# Patient Record
Sex: MALE | Race: WHITE | NOT HISPANIC OR LATINO | Employment: OTHER | URBAN - NONMETROPOLITAN AREA
[De-identification: names, ages, dates, MRNs, and addresses within clinical notes are randomized per-mention and may not be internally consistent; named-entity substitution may affect disease eponyms.]

---

## 2022-12-12 ENCOUNTER — OFFICE VISIT (OUTPATIENT)
Dept: CARDIOLOGY | Facility: CLINIC | Age: 72
End: 2022-12-12

## 2022-12-12 VITALS
OXYGEN SATURATION: 97 % | DIASTOLIC BLOOD PRESSURE: 74 MMHG | SYSTOLIC BLOOD PRESSURE: 133 MMHG | TEMPERATURE: 98.2 F | WEIGHT: 158 LBS | HEART RATE: 97 BPM | BODY MASS INDEX: 23.95 KG/M2 | HEIGHT: 68 IN

## 2022-12-12 DIAGNOSIS — R93.89 ABNORMAL CT OF THE CHEST: ICD-10-CM

## 2022-12-12 DIAGNOSIS — R07.89 OTHER CHEST PAIN: Primary | ICD-10-CM

## 2022-12-12 DIAGNOSIS — R06.02 SHORTNESS OF BREATH: ICD-10-CM

## 2022-12-12 DIAGNOSIS — I48.91 ATRIAL FIBRILLATION, UNSPECIFIED TYPE: ICD-10-CM

## 2022-12-12 DIAGNOSIS — R00.2 PALPITATIONS: ICD-10-CM

## 2022-12-12 DIAGNOSIS — I25.10 CORONARY ARTERY DISEASE INVOLVING NATIVE CORONARY ARTERY OF NATIVE HEART WITHOUT ANGINA PECTORIS: ICD-10-CM

## 2022-12-12 PROBLEM — I63.9 CVA (CEREBRAL VASCULAR ACCIDENT): Status: ACTIVE | Noted: 2022-12-12

## 2022-12-12 PROBLEM — E11.9 TYPE 2 DIABETES MELLITUS WITHOUT COMPLICATION, WITHOUT LONG-TERM CURRENT USE OF INSULIN: Status: ACTIVE | Noted: 2022-12-12

## 2022-12-12 PROBLEM — M19.90 ARTHRITIS: Status: ACTIVE | Noted: 2022-12-12

## 2022-12-12 PROBLEM — K21.9 GERD (GASTROESOPHAGEAL REFLUX DISEASE): Status: ACTIVE | Noted: 2022-12-12

## 2022-12-12 PROBLEM — J44.9 COPD (CHRONIC OBSTRUCTIVE PULMONARY DISEASE): Status: ACTIVE | Noted: 2022-12-12

## 2022-12-12 PROBLEM — I25.810 CORONARY ARTERY DISEASE INVOLVING CORONARY BYPASS GRAFT OF NATIVE HEART WITHOUT ANGINA PECTORIS: Status: ACTIVE | Noted: 2022-12-12

## 2022-12-12 PROBLEM — I61.9 BRAIN BLEED: Status: ACTIVE | Noted: 2022-12-12

## 2022-12-12 PROCEDURE — 99204 OFFICE O/P NEW MOD 45 MIN: CPT | Performed by: NURSE PRACTITIONER

## 2022-12-12 PROCEDURE — 93000 ELECTROCARDIOGRAM COMPLETE: CPT | Performed by: NURSE PRACTITIONER

## 2022-12-12 RX ORDER — GLIPIZIDE 10 MG/1
10 TABLET ORAL DAILY
COMMUNITY
End: 2023-02-23

## 2022-12-12 RX ORDER — INSULIN GLARGINE 100 [IU]/ML
20 INJECTION, SOLUTION SUBCUTANEOUS DAILY
COMMUNITY

## 2022-12-12 RX ORDER — NITROGLYCERIN 0.4 MG/1
TABLET SUBLINGUAL
Qty: 30 TABLET | Refills: 5 | Status: SHIPPED | OUTPATIENT
Start: 2022-12-12 | End: 2022-12-13 | Stop reason: SDUPTHER

## 2022-12-12 RX ORDER — DULOXETIN HYDROCHLORIDE 30 MG/1
30 CAPSULE, DELAYED RELEASE ORAL DAILY
COMMUNITY

## 2022-12-12 RX ORDER — OMEPRAZOLE 20 MG/1
20 CAPSULE, DELAYED RELEASE ORAL AS NEEDED
COMMUNITY

## 2022-12-12 RX ORDER — ALBUTEROL SULFATE 2.5 MG/3ML
2.5 SOLUTION RESPIRATORY (INHALATION) EVERY 4 HOURS PRN
COMMUNITY

## 2022-12-12 RX ORDER — MULTIPLE VITAMINS W/ MINERALS TAB 9MG-400MCG
1 TAB ORAL DAILY
COMMUNITY

## 2022-12-12 RX ORDER — QUETIAPINE 150 MG/1
150 TABLET, FILM COATED, EXTENDED RELEASE ORAL NIGHTLY
COMMUNITY

## 2022-12-12 NOTE — PROGRESS NOTES
Subjective   Deejay Messina is a 72 y.o. male     Chief Complaint   Patient presents with   • Establish Care       HPI    Problem list:    1.  CAD with history of stent x2 per patient report 1982, [facility] in [place], followed Dr. Moeller [phone]  1.1 [facility] with Stent 5/6/2005, patient unsure what artery, Dr. Koch, [facility]  1.2  CTA of the lungs 9/15/2022-severe atherosclerotic plaque formation throughout the coronary arteries, 3.5 similar noncalcified nodule left lower lobe  2.  A. fib diagnosed in 2016, history of loop recorder 3/10/17 - 11/28/17; 2 episodes less than 0.1%, no anticoagulation/antiplatelet due to brain bleed  3.  Type 2 diabetes mellitus diagnosed in 2020  4.  Shortness of breath  4.1 echo 11/20/2018-EF 55 to 60%, diastolic dysfunction 1, trivial AR, moderate MR, TR with RVSP of 26  5.  COPD/asbestos exposure  6.  GERD  7.  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  History of brain bleed 2019 [facility] in [place], [place], craniotomy    Patient is a 72-year-old male who presents today to establish care with his wife at his side.  He said he had right anterior sharp pain that did not radiate.  He says it was active whenever it happened.  He says it is kind of random.  He said he did have to stop and rest for a while.  He did use his inhaler but it did not help.  He said that he did get short of breath whenever it happened.  He says if he has palpitations he really does not notice them.  He was told that they would occur around 3 or 4:00 in the morning.  He did have a loop recorder for 6 months.  He denies any dizziness, presyncope, syncope, orthopnea, PND or edema.  He is short of breath but only with increased activity and this has not changed.  [facility] and [facility] will [facility] those [facility] as well.  CT of the chest indicates that the patient has severe atherosclerotic plaque formation throughout the coronary  arteries.  Patient if not for like wearing a monitor was necessary at this time.    Occupation: Used to work for the electric company, retired he was a substation worker  Patient quit smoking 2016 but smoked 2 packs a day for 40+ years; couple of beers per day; no illicit drugs  Denies soda; 5 cups of coffee per day; no tea    Mom 86 -permanent pacemaker, bradycardia  Dad 62 -COPD, heart issues    We have requested records from his cardiologist, [place], [place] and [facility]. patient's LDL was 99, triglycerides 91, creatinine 1.09 and his potassium was 4.5    Current Outpatient Medications on File Prior to Visit   Medication Sig Dispense Refill   • albuterol (PROVENTIL) (2.5 MG/3ML) 0.083% nebulizer solution Take 2.5 mg by nebulization Every 4 (Four) Hours As Needed for Wheezing.     • dilTIAZem (CARDIZEM) 30 MG tablet Take 15 mg by mouth 3 (Three) Times a Day.     • DULoxetine (CYMBALTA) 30 MG capsule Take 30 mg by mouth Daily.     • glipizide (GLUCOTROL) 10 MG tablet Take 10 mg by mouth Daily.     • insulin glargine (LANTUS, SEMGLEE) 100 UNIT/ML injection Inject 20 Units under the skin into the appropriate area as directed Daily. Sliding Scale - if its over 200 ( Blood Sugar ) pt will take 20 units     • ipratropium (ATROVENT HFA) 17 MCG/ACT inhaler Inhale 2 puffs As Needed for Wheezing.     • multivitamin with minerals tablet tablet Take 1 tablet by mouth Daily.     • omeprazole (priLOSEC) 20 MG capsule Take 20 mg by mouth As Needed.     • QUEtiapine XR (SEROquel XR) 150 MG 24 hr tablet Take 150 mg by mouth Every Night.       No current facility-administered medications on file prior to visit.       ALLERGIES    Codeine, Cotrim [sulfamethoxazole-trimethoprim], Aspirin, and Eliquis [apixaban]    Past Medical History:   Diagnosis Date   • Atrial fibrillation (HCC)    • Clotting disorder (HCC)    • COPD (chronic obstructive pulmonary disease) (HCC)    • Diabetes mellitus  ([facility])    • Stroke ([facility])        Social History     Socioeconomic History   • Marital status:    Tobacco Use   • Smoking status: Former     Packs/day: 1.00     Years: 40.00     Pack years: 40.00     Types: Cigarettes   • Smokeless tobacco: Never   Vaping Use   • Vaping Use: Never used   Substance and Sexual Activity   • Alcohol use: Yes     Alcohol/week: 2.0 standard drinks     Types: 2 Cans of beer per week     Comment: per day   • Drug use: Never   • Sexual activity: Defer       Family History   Problem Relation Age of Onset   • Heart disease Mother    • Hypertension Mother    • Diabetes Mother    • Heart disease Father    • Hypertension Father    • COPD Father        Review of Systems   Constitutional: Positive for appetite change (increased due to medication ). Negative for chills, diaphoresis, fatigue and fever.   HENT: Positive for congestion (chest radha due to COPD ) and hearing loss. Negative for rhinorrhea and sore throat.    Eyes: Positive for visual disturbance (reading glasses ).   Respiratory: Positive for shortness of breath (with CP; with increased activity; no change ). Negative for cough, chest tightness and wheezing.    Cardiovascular: Positive for chest pain (right anterior sharp pain with activity; no rad;    had to stop and rest for awhile, had to use rescue inhaler; did not help; random) and palpitations (a-fib ( silent and hits around 3-4 am in the am when he wore a monitor ); does not feel it ). Negative for leg swelling.   Gastrointestinal: Negative for abdominal pain, blood in stool, constipation, diarrhea, nausea and vomiting.   Endocrine: Negative for cold intolerance and heat intolerance.   Genitourinary: Positive for frequency (worse in the am ). Negative for difficulty urinating, dysuria, hematuria and urgency.   Musculoskeletal: Positive for arthralgias (throughout the body ), back pain (lower back ) and neck pain (back of the neck ). Negative for joint swelling and neck  "stiffness.   Skin: Negative for color change, pallor, rash and wound.   Allergic/Immunologic: Negative for environmental allergies and food allergies.   Neurological: Positive for weakness (left knee ) and numbness (knees due to past SX ). Negative for dizziness, syncope, light-headedness and headaches.   Hematological: Bruises/bleeds easily (Brusies and bleeds easy ).   Psychiatric/Behavioral: Positive for sleep disturbance (hard to go and hard to stay asleep ).       Objective   /74 (BP Location: Right arm, Patient Position: Sitting)   Pulse 97   Temp 98.2 °F (36.8 °C)   Ht 172.7 cm (68\")   Wt 71.7 kg (158 lb)   SpO2 97%   BMI 24.02 kg/m²   Vitals:    12/12/22 1513   BP: 133/74   BP Location: Right arm   Patient Position: Sitting   Pulse: 97   Temp: 98.2 °F (36.8 °C)   SpO2: 97%   Weight: 71.7 kg (158 lb)   Height: 172.7 cm (68\")      Lab Results (most recent)     None        Physical Exam  Vitals reviewed.   Constitutional:       General: He is awake.      Appearance: Normal appearance. He is well-developed, well-groomed and normal weight.   HENT:      Head: Normocephalic.      Right Ear: Decreased hearing noted.      Left Ear: Decreased hearing noted.      Ears:      Comments: Right hearing aid   Eyes:      General: Lids are normal.      Comments: Wears glasses    Neck:      Vascular: No carotid bruit, hepatojugular reflux or JVD.   Cardiovascular:      Rate and Rhythm: Normal rate and regular rhythm.      Pulses:           Radial pulses are 2+ on the right side and 2+ on the left side.        Dorsalis pedis pulses are 2+ on the right side and 2+ on the left side.        Posterior tibial pulses are 2+ on the right side and 2+ on the left side.      Heart sounds: Normal heart sounds.   Pulmonary:      Effort: Pulmonary effort is normal.      Breath sounds: Normal breath sounds and air entry.   Abdominal:      General: Bowel sounds are normal.      Palpations: Abdomen is soft.   Musculoskeletal:      " Right lower leg: No edema.      Left lower leg: No edema.   Skin:     General: Skin is warm and dry.   Neurological:      Mental Status: He is alert and oriented to person, place, and time.   Psychiatric:         Attention and Perception: Attention and perception normal.         Mood and Affect: Mood and affect normal.         Speech: Speech normal.         Behavior: Behavior normal. Behavior is cooperative.         Thought Content: Thought content normal.         Cognition and Memory: Cognition normal. Memory is impaired.         Judgment: Judgment normal.         Procedure     ECG 12 Lead    Date/Time: 12/12/2022 4:24 PM  Performed by: Janice Amezcua APRN  Authorized by: Janice Amezcua APRN   Comparison: not compared with previous ECG   Rhythm: sinus rhythm  Rate: normal  BPM: 84  QRS axis: normal  Other findings: non-specific ST-T wave changes    Clinical impression: abnormal EKG                 Assessment & Plan      Diagnosis Plan   1. Other chest pain  Stress Test With Myocardial Perfusion One Day    Adult Transthoracic Echo Complete W/ Cont if Necessary Per Protocol    nitroglycerin (NITROSTAT) 0.4 MG SL tablet      2. Coronary artery disease involving native coronary artery of native heart without angina pectoris  Stress Test With Myocardial Perfusion One Day    Adult Transthoracic Echo Complete W/ Cont if Necessary Per Protocol    nitroglycerin (NITROSTAT) 0.4 MG SL tablet      3. Atrial fibrillation, unspecified type (HCC)  Stress Test With Myocardial Perfusion One Day    Adult Transthoracic Echo Complete W/ Cont if Necessary Per Protocol      4. Abnormal CT of the chest  Stress Test With Myocardial Perfusion One Day    Adult Transthoracic Echo Complete W/ Cont if Necessary Per Protocol      5. Palpitations  Stress Test With Myocardial Perfusion One Day    Adult Transthoracic Echo Complete W/ Cont if Necessary Per Protocol      6. Shortness of breath  Stress Test With Myocardial Perfusion One Day     Adult Transthoracic Echo Complete W/ Cont if Necessary Per Protocol          Return in about 12 weeks (around 3/6/2023).    Chest pain/CAD/A. fib/abnormal CT of the chest/palpitation/shortness of breath-patient will have an ischemia work-up, stress and echo.  He will use nitro as needed for chest pain no resolution to go to the ER.  He will continue his medication regimen otherwise.  He will follow-up in 12 weeks or sooner if any changes or abnormalities with testing.       Deejay Messina  reports that he has quit smoking. His smoking use included cigarettes. He has a 40.00 pack-year smoking history. He has never used smokeless tobacco.. Advance Care Planning   ACP discussion was declined by the patient. Patient has an advance directive (not in EMR), copy requested.  Patient update medication on My Chart        Electronically signed by:

## 2022-12-13 DIAGNOSIS — R07.89 OTHER CHEST PAIN: ICD-10-CM

## 2022-12-13 DIAGNOSIS — I25.10 CORONARY ARTERY DISEASE INVOLVING NATIVE CORONARY ARTERY OF NATIVE HEART WITHOUT ANGINA PECTORIS: ICD-10-CM

## 2022-12-13 RX ORDER — NITROGLYCERIN 0.4 MG/1
TABLET SUBLINGUAL
Qty: 25 TABLET | Refills: 5 | Status: SHIPPED | OUTPATIENT
Start: 2022-12-13 | End: 2023-01-03 | Stop reason: SDUPTHER

## 2022-12-13 NOTE — TELEPHONE ENCOUNTER
Patient called requesting Nitro sent to [facility]. He is not able to pick it up at [facility] and they can not mail it.

## 2022-12-16 ENCOUNTER — PATIENT ROUNDING (BHMG ONLY) (OUTPATIENT)
Dept: CARDIOLOGY | Facility: CLINIC | Age: 72
End: 2022-12-16

## 2022-12-16 NOTE — PROGRESS NOTES
December 16, 2022    Hello, may I speak with Deejay Messina?    My name is SHERRY MARIE     I am  with [facility]  [address] [place] [id number] [phone].    Before we get started may I verify your date of birth? 1950    I am calling to officially welcome you to our practice and ask about your recent visit. Is this a good time to talk? no    LVM FOR PT TO RETURN NON-URGENT CALL.      Thank you, and have a great day.

## 2022-12-27 ENCOUNTER — HOSPITAL ENCOUNTER (OUTPATIENT)
Dept: CARDIOLOGY | Facility: HOSPITAL | Age: 72
Discharge: HOME | End: 2022-12-27

## 2022-12-27 DIAGNOSIS — I25.10 CORONARY ARTERY DISEASE INVOLVING NATIVE CORONARY ARTERY OF NATIVE HEART WITHOUT ANGINA PECTORIS: ICD-10-CM

## 2022-12-27 DIAGNOSIS — I48.91 ATRIAL FIBRILLATION, UNSPECIFIED TYPE: ICD-10-CM

## 2022-12-27 DIAGNOSIS — R00.2 PALPITATIONS: ICD-10-CM

## 2022-12-27 DIAGNOSIS — R07.89 OTHER CHEST PAIN: ICD-10-CM

## 2022-12-27 DIAGNOSIS — R06.02 SHORTNESS OF BREATH: ICD-10-CM

## 2022-12-27 DIAGNOSIS — R93.89 ABNORMAL CT OF THE CHEST: ICD-10-CM

## 2022-12-27 LAB
BH CV REST NUCLEAR ISOTOPE DOSE: 10 MCI
BH CV STRESS COMMENTS STAGE 1: NORMAL
BH CV STRESS DOSE REGADENOSON STAGE 1: 0.4
BH CV STRESS DURATION MIN STAGE 1: 0
BH CV STRESS DURATION SEC STAGE 1: 10
BH CV STRESS NUCLEAR ISOTOPE DOSE: 30 MCI
BH CV STRESS PROTOCOL 1: NORMAL
BH CV STRESS RECOVERY BP: NORMAL MMHG
BH CV STRESS RECOVERY HR: 80 BPM
BH CV STRESS STAGE 1: 1
MAXIMAL PREDICTED HEART RATE: 146 BPM
PERCENT MAX PREDICTED HR: 54.79 %
STRESS BASELINE BP: NORMAL MMHG
STRESS BASELINE HR: 57 BPM
STRESS PERCENT HR: 64 %
STRESS POST PEAK BP: NORMAL MMHG
STRESS POST PEAK HR: 80 BPM
STRESS TARGET HR: 124 BPM

## 2022-12-27 PROCEDURE — 0 TECHNETIUM SESTAMIBI: Performed by: INTERNAL MEDICINE

## 2022-12-27 PROCEDURE — 78452 HT MUSCLE IMAGE SPECT MULT: CPT

## 2022-12-27 PROCEDURE — 93306 TTE W/DOPPLER COMPLETE: CPT

## 2022-12-27 PROCEDURE — 93017 CV STRESS TEST TRACING ONLY: CPT

## 2022-12-27 PROCEDURE — 93306 TTE W/DOPPLER COMPLETE: CPT | Performed by: INTERNAL MEDICINE

## 2022-12-27 PROCEDURE — A9500 TC99M SESTAMIBI: HCPCS | Performed by: INTERNAL MEDICINE

## 2022-12-27 PROCEDURE — 78452 HT MUSCLE IMAGE SPECT MULT: CPT | Performed by: INTERNAL MEDICINE

## 2022-12-27 PROCEDURE — 25010000002 REGADENOSON 0.4 MG/5ML SOLUTION: Performed by: INTERNAL MEDICINE

## 2022-12-27 PROCEDURE — 93018 CV STRESS TEST I&R ONLY: CPT | Performed by: INTERNAL MEDICINE

## 2022-12-27 RX ADMIN — TECHNETIUM TC 99M SESTAMIBI 1 DOSE: 1 INJECTION INTRAVENOUS at 09:24

## 2022-12-27 RX ADMIN — REGADENOSON 0.4 MG: 0.08 INJECTION, SOLUTION INTRAVENOUS at 11:29

## 2022-12-27 RX ADMIN — TECHNETIUM TC 99M SESTAMIBI 1 DOSE: 1 INJECTION INTRAVENOUS at 11:29

## 2023-01-03 ENCOUNTER — OFFICE VISIT (OUTPATIENT)
Dept: CARDIOLOGY | Facility: CLINIC | Age: 73
End: 2023-01-03
Payer: MEDICARE

## 2023-01-03 ENCOUNTER — LAB (OUTPATIENT)
Dept: CARDIOLOGY | Facility: CLINIC | Age: 73
End: 2023-01-03
Payer: MEDICARE

## 2023-01-03 ENCOUNTER — TELEPHONE (OUTPATIENT)
Dept: CARDIOLOGY | Facility: CLINIC | Age: 73
End: 2023-01-03
Payer: MEDICARE

## 2023-01-03 VITALS
HEART RATE: 102 BPM | OXYGEN SATURATION: 97 % | TEMPERATURE: 97.1 F | SYSTOLIC BLOOD PRESSURE: 120 MMHG | WEIGHT: 156 LBS | DIASTOLIC BLOOD PRESSURE: 70 MMHG | HEIGHT: 68 IN | BODY MASS INDEX: 23.64 KG/M2

## 2023-01-03 DIAGNOSIS — R93.89 ABNORMAL CT OF THE CHEST: ICD-10-CM

## 2023-01-03 DIAGNOSIS — R94.39 ABNORMAL STRESS TEST: Primary | ICD-10-CM

## 2023-01-03 DIAGNOSIS — I25.10 CORONARY ARTERY DISEASE INVOLVING NATIVE CORONARY ARTERY OF NATIVE HEART WITHOUT ANGINA PECTORIS: ICD-10-CM

## 2023-01-03 DIAGNOSIS — R06.02 SHORTNESS OF BREATH: ICD-10-CM

## 2023-01-03 DIAGNOSIS — R07.89 OTHER CHEST PAIN: ICD-10-CM

## 2023-01-03 DIAGNOSIS — I48.91 ATRIAL FIBRILLATION, UNSPECIFIED TYPE: ICD-10-CM

## 2023-01-03 DIAGNOSIS — Z98.890 H/O CRANIOTOMY: ICD-10-CM

## 2023-01-03 LAB
ANION GAP SERPL CALCULATED.3IONS-SCNC: 12.6 MMOL/L (ref 5–15)
BUN SERPL-MCNC: 13 MG/DL (ref 8–23)
BUN/CREAT SERPL: 14.8 (ref 7–25)
CALCIUM SPEC-SCNC: 8.8 MG/DL (ref 8.6–10.5)
CHLORIDE SERPL-SCNC: 102 MMOL/L (ref 98–107)
CO2 SERPL-SCNC: 24.4 MMOL/L (ref 22–29)
CREAT SERPL-MCNC: 0.88 MG/DL (ref 0.76–1.27)
DEPRECATED RDW RBC AUTO: 45.3 FL (ref 37–54)
EGFRCR SERPLBLD CKD-EPI 2021: 90.2 ML/MIN/1.73
ERYTHROCYTE [DISTWIDTH] IN BLOOD BY AUTOMATED COUNT: 13 % (ref 12.3–15.4)
GLUCOSE SERPL-MCNC: 120 MG/DL (ref 65–99)
HCT VFR BLD AUTO: 44.9 % (ref 37.5–51)
HGB BLD-MCNC: 15.3 G/DL (ref 13–17.7)
MCH RBC QN AUTO: 32.3 PG (ref 26.6–33)
MCHC RBC AUTO-ENTMCNC: 34.1 G/DL (ref 31.5–35.7)
MCV RBC AUTO: 94.7 FL (ref 79–97)
PLATELET # BLD AUTO: 259 10*3/MM3 (ref 140–450)
PMV BLD AUTO: 10.2 FL (ref 6–12)
POTASSIUM SERPL-SCNC: 4.2 MMOL/L (ref 3.5–5.2)
RBC # BLD AUTO: 4.74 10*6/MM3 (ref 4.14–5.8)
SODIUM SERPL-SCNC: 139 MMOL/L (ref 136–145)
WBC NRBC COR # BLD: 6.54 10*3/MM3 (ref 3.4–10.8)

## 2023-01-03 PROCEDURE — 99214 OFFICE O/P EST MOD 30 MIN: CPT | Performed by: NURSE PRACTITIONER

## 2023-01-03 PROCEDURE — 80048 BASIC METABOLIC PNL TOTAL CA: CPT | Performed by: NURSE PRACTITIONER

## 2023-01-03 PROCEDURE — 1159F MED LIST DOCD IN RCRD: CPT | Performed by: NURSE PRACTITIONER

## 2023-01-03 PROCEDURE — 85027 COMPLETE CBC AUTOMATED: CPT | Performed by: NURSE PRACTITIONER

## 2023-01-03 PROCEDURE — 1160F RVW MEDS BY RX/DR IN RCRD: CPT | Performed by: NURSE PRACTITIONER

## 2023-01-03 RX ORDER — NITROGLYCERIN 0.4 MG/1
TABLET SUBLINGUAL
Qty: 50 TABLET | Refills: 5 | Status: SHIPPED | OUTPATIENT
Start: 2023-01-03

## 2023-01-03 RX ORDER — ISOSORBIDE MONONITRATE 30 MG/1
15 TABLET, EXTENDED RELEASE ORAL DAILY
Qty: 30 TABLET | Refills: 11 | Status: SHIPPED | OUTPATIENT
Start: 2023-01-03 | End: 2023-02-23 | Stop reason: SDUPTHER

## 2023-01-03 NOTE — PROGRESS NOTES
Subjective   Deejay Messina is a 72 y.o. male     Chief Complaint   Patient presents with   • Follow-up     Chest pain          HPI    Problem list:    1.  CAD with history of stent x2 per patient report 1982, [facility] in [place], followed Dr. Moeller [phone]  1.1 [facility] with Stent 5/6/2005, patient unsure what artery, Dr. Koch, [facility]  1.2  CTA of the lungs 9/15/2022-severe atherosclerotic plaque formation throughout the coronary arteries, 3.5 similar noncalcified nodule left lower lobe  1.3 stress test 12/27/2022-large, dense but incompletely reversible defect involving the entirety of the inferior wall as well as the more basal portions of the inferolateral wall compatible with ischemia, post-rest EF 48%, no wall motion abnormalities.  2.  A. fib diagnosed in 2016, history of loop recorder 3/10/17 - 11/28/17; 2 episodes less than 0.1%, no anticoagulation/antiplatelet due to brain bleed  3.  Type 2 diabetes mellitus diagnosed in 2020  4.  Shortness of breath  4.1 echo 11/20/2018-EF 55 to 60%, diastolic dysfunction 1, trivial AR, moderate MR, TR with RVSP of 26  5.  COPD/asbestos exposure  6.  GERD  7.  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  History of brain bleed 2019 [facility] in [place], [place], craniotomy    Patient is a 72-year-old male who presents today for follow-up on stress test with his wife at his side.  He continues to have what he describes as a right anterior sharp pain that occasionally goes into his back.  He says that he also has on the left on occasion.  He says he usually notices it with rest.  He does get short of breath whenever it happens.  He does use nitroglycerin up to 1 tab and it does go away.  He says he was having it daily and then he had a little reprieve for a few days and then it came back.  He says it typically lasts about 3 minutes.  He denies any palpitations, fluttering, dizziness, presyncope, syncope, orthopnea, PND or edema.   Patient says that he does have shortness of breath whenever he exerts himself but this is new for him also.  Patient says he also gets a burning in his chest and he thinks this is heartburn.  I advised her bradycardia could also be cardiac in nature.  Patient has not followed up with neurosurgery since having his craniotomy back in 2019.  I advised due to the fact he will need antiplatelet should he get a stent that we need to have clearance from neurosurgery.  They are willing to go to the local neurosurgeons here for clearance.    We went over stress test.    Current Outpatient Medications on File Prior to Visit   Medication Sig Dispense Refill   • albuterol (PROVENTIL) (2.5 MG/3ML) 0.083% nebulizer solution Take 2.5 mg by nebulization Every 4 (Four) Hours As Needed for Wheezing.     • dilTIAZem (CARDIZEM) 30 MG tablet Take 15 mg by mouth 3 (Three) Times a Day.     • DULoxetine (CYMBALTA) 30 MG capsule Take 30 mg by mouth Daily.     • glipizide (GLUCOTROL) 10 MG tablet Take 10 mg by mouth Daily.     • insulin glargine (LANTUS, SEMGLEE) 100 UNIT/ML injection Inject 20 Units under the skin into the appropriate area as directed Daily. Sliding Scale - if its over 200 ( Blood Sugar ) pt will take 20 units     • ipratropium (ATROVENT HFA) 17 MCG/ACT inhaler Inhale 2 puffs As Needed for Wheezing.     • multivitamin with minerals tablet tablet Take 1 tablet by mouth Daily.     • nitroglycerin (NITROSTAT) 0.4 MG SL tablet 1 under the tongue as needed for angina, may repeat q5mins for up three doses 25 tablet 5   • omeprazole (priLOSEC) 20 MG capsule Take 20 mg by mouth As Needed.     • QUEtiapine XR (SEROquel XR) 150 MG 24 hr tablet Take 150 mg by mouth Every Night.       No current facility-administered medications on file prior to visit.       ALLERGIES    Codeine, Cotrim [sulfamethoxazole-trimethoprim], Aspirin, and Eliquis [apixaban]    Past Medical History:   Diagnosis Date   • Atrial fibrillation (HCC)    • Clotting  disorder (HCC)    • COPD (chronic obstructive pulmonary disease) ([facility])    • Diabetes mellitus (HCC)    • Stroke ([facility])        Social History     Socioeconomic History   • Marital status:    Tobacco Use   • Smoking status: Former     Packs/day: 1.00     Years: 40.00     Pack years: 40.00     Types: Cigarettes   • Smokeless tobacco: Never   Vaping Use   • Vaping Use: Never used   Substance and Sexual Activity   • Alcohol use: Yes     Alcohol/week: 2.0 standard drinks     Types: 2 Cans of beer per week     Comment: per day   • Drug use: Never   • Sexual activity: Defer       Family History   Problem Relation Age of Onset   • Heart disease Mother    • Hypertension Mother    • Diabetes Mother    • Heart disease Father    • Hypertension Father    • COPD Father        Review of Systems   Constitutional: Negative for appetite change, chills, diaphoresis, fatigue and fever.   HENT: Negative for congestion, rhinorrhea and sore throat.    Eyes: Positive for visual disturbance (glasses ( reading ) ).   Respiratory: Positive for shortness of breath (with any type of activity or walking at any distance he states that this is new and he will have to stop and rest and wait for a few mins; with CP ). Negative for cough, chest tightness and wheezing.    Cardiovascular: Positive for chest pain (right anterior goes into back on occasion, sharp in nature, goes away with 1 nitro sometimes gets left anterior; usually at rest; initially daily then had a 3 day repreive and now back; most recent did not have to take nitro; lasts maybe 3 min). Negative for palpitations and leg swelling.   Gastrointestinal: Negative for abdominal pain, blood in stool, constipation, diarrhea, nausea and vomiting.        Burning sensation as well feels like it is burning    Endocrine: Negative for cold intolerance and heat intolerance.   Genitourinary: Negative for difficulty urinating, dysuria, frequency, hematuria and urgency.   Musculoskeletal: Positive  for arthralgias (neck ,back and knees ), back pain (lower back ) and neck pain (back of the neck due to arthritis ). Negative for joint swelling and neck stiffness.   Skin: Positive for rash (left thumb ). Negative for color change, pallor and wound.   Allergic/Immunologic: Negative for environmental allergies and food allergies.   Neurological: Positive for numbness (feet ( numbness ) from time to time ) and headaches (everyday ( front of the head ) he states that this is new ). Negative for dizziness, syncope, weakness and light-headedness.   Hematological: Bruises/bleeds easily (Bruises easy ).   Psychiatric/Behavioral: Negative for sleep disturbance.       Objective   /70 (BP Location: Right arm, Patient Position: Sitting)   Pulse 102   Temp 97.1 °F (36.2 °C)   Ht 172.7 cm (68\")   Wt 70.8 kg (156 lb)   SpO2 97%   BMI 23.72 kg/m²   Vitals:    01/03/23 0918   BP: 120/70   BP Location: Right arm   Patient Position: Sitting   Pulse: 102   Temp: 97.1 °F (36.2 °C)   SpO2: 97%   Weight: 70.8 kg (156 lb)   Height: 172.7 cm (68\")      Lab Results (most recent)     None        Physical Exam  Vitals reviewed.   Constitutional:       General: He is awake.      Appearance: Normal appearance. He is well-developed, well-groomed and normal weight.   HENT:      Head: Normocephalic.      Right Ear: Decreased hearing noted.      Left Ear: Decreased hearing noted.      Ears:      Comments: [place] hearing aids.  Eyes:      General: Lids are normal.      Comments: Wears glasses    Neck:      Vascular: No carotid bruit, hepatojugular reflux or JVD.   Cardiovascular:      Rate and Rhythm: Regular rhythm. Tachycardia present.      Pulses:           Radial pulses are 2+ on the right side and 2+ on the left side.        Dorsalis pedis pulses are 2+ on the right side and 2+ on the left side.        Posterior tibial pulses are 2+ on the right side and 2+ on the left side.      Heart sounds: Normal heart sounds.   Pulmonary:       Effort: Pulmonary effort is normal.      Breath sounds: Normal breath sounds and air entry.   Abdominal:      General: Bowel sounds are normal.      Palpations: Abdomen is soft.   Musculoskeletal:      Right lower leg: No edema.      Left lower leg: No edema.   Skin:     General: Skin is warm and dry.   Neurological:      Mental Status: He is alert and oriented to person, place, and time.   Psychiatric:         Attention and Perception: Attention and perception normal.         Mood and Affect: Mood and affect normal.         Speech: Speech normal.         Behavior: Behavior normal. Behavior is cooperative.         Thought Content: Thought content normal.         Cognition and Memory: Cognition and memory normal.         Judgment: Judgment normal.         Procedure   Procedures         Assessment & Plan      Diagnosis Plan   1. Abnormal stress test  [facility]      2. Coronary artery disease involving native coronary artery of native heart without angina pectoris  [facility]    CBC (No Diff)    Basic Metabolic Panel      3. Atrial fibrillation, unspecified type (HCC)  [facility]      4. Shortness of breath  [facility]      5. Abnormal CT of the chest  [facility]      6. Other chest pain  [facility]      7. H/O craniotomy  Ambulatory Referral to Neurosurgery          Return 2-4 WEEKS AFTER [facility].    Abnormal stress test/CAD/A. fib/shortness of breath/abnormal CT of the chest/chest pain-patient will have left heart cath in February.  We are doing this because patient has a history of craniotomy and we need neurosurgery cleared for dual antiplatelet should he receive stents.  We will refer him to Dr. Chaidez/Octavio for clearance as he has not followed up with anybody since having his craniotomy back in 2019.  We are in the process of still trying to receive his records that way we will be able for them as well.  He is going start isosorbide half a tab a  day.  He will use nitro as needed for chest pain no resolution he will go to the ER.  He will get a BMP and CBC today.  He will follow-up 2 to 4 weeks after heart catheter or sooner if any changes.    Again the referral for neurosurgery is to see if patient will be able to have dual antiplatelet should he receive stents and if so for how long.  Typically he needs to be on dual antiplatelet for 6 months and then we can go down to single antiplatelet after that.       Deejay Messina  reports that he has quit smoking. His smoking use included cigarettes. He has a 40.00 pack-year smoking history. He has never used smokeless tobacco..     Patient brought in medicine list to appointment, it's been reviewed with patient and med list was updated in the chart.     Advance Care Planning   ACP discussion was declined by the patient. Patient has an advance directive (not in EMR), copy requested.         Electronically signed by:

## 2023-01-03 NOTE — TELEPHONE ENCOUNTER
----- Message from MARTHA Cisneros sent at 1/3/2023  3:01 PM EST -----  CR AND PLATELETS WITHIN NORMAL LIMITS FOR LHC            Sent pt a my chart mess regarding labs for LHC ( WNL)    Parris Martinez , RMAURELIA    Creatinine  0.7  Platelets  140 - 450 10*3/mm3 259        Platelets  140 - 450 10*3/mm3 259      6 - 1.27 mg/dL 0.88      Platelets  140 - 450 10*3/mm3 259

## 2023-01-04 LAB
BH CV ECHO MEAS - ACS: 2.24 CM
BH CV ECHO MEAS - AO MAX PG: 7.8 MMHG
BH CV ECHO MEAS - AO MEAN PG: 4.5 MMHG
BH CV ECHO MEAS - AO ROOT DIAM: 3.5 CM
BH CV ECHO MEAS - AO V2 MAX: 139.8 CM/SEC
BH CV ECHO MEAS - AO V2 VTI: 34.5 CM
BH CV ECHO MEAS - EDV(CUBED): 79 ML
BH CV ECHO MEAS - EDV(MOD-SP4): 74.7 ML
BH CV ECHO MEAS - EF(MOD-SP4): 61.8 %
BH CV ECHO MEAS - EF_3D-VOL: 63 %
BH CV ECHO MEAS - ESV(CUBED): 16.6 ML
BH CV ECHO MEAS - ESV(MOD-SP4): 28.5 ML
BH CV ECHO MEAS - FS: 40.6 %
BH CV ECHO MEAS - IVS/LVPW: 0.91 CM
BH CV ECHO MEAS - IVSD: 1.2 CM
BH CV ECHO MEAS - LA DIMENSION: 3.4 CM
BH CV ECHO MEAS - LAT PEAK E' VEL: 6.5 CM/SEC
BH CV ECHO MEAS - LV DIASTOLIC VOL/BSA (35-75): 40.4 CM2
BH CV ECHO MEAS - LV MASS(C)D: 197.7 GRAMS
BH CV ECHO MEAS - LV SYSTOLIC VOL/BSA (12-30): 15.4 CM2
BH CV ECHO MEAS - LVIDD: 4.3 CM
BH CV ECHO MEAS - LVIDS: 2.6 CM
BH CV ECHO MEAS - LVOT AREA: 3.1 CM2
BH CV ECHO MEAS - LVOT DIAM: 1.97 CM
BH CV ECHO MEAS - LVPWD: 1.32 CM
BH CV ECHO MEAS - MED PEAK E' VEL: 7.1 CM/SEC
BH CV ECHO MEAS - MV A MAX VEL: 110.5 CM/SEC
BH CV ECHO MEAS - MV DEC SLOPE: 368.9 CM/SEC2
BH CV ECHO MEAS - MV E MAX VEL: 92.1 CM/SEC
BH CV ECHO MEAS - MV E/A: 0.83
BH CV ECHO MEAS - RAP SYSTOLE: 10 MMHG
BH CV ECHO MEAS - RVDD: 3.3 CM
BH CV ECHO MEAS - RVSP: 35.5 MMHG
BH CV ECHO MEAS - SI(MOD-SP4): 25 ML/M2
BH CV ECHO MEAS - SV(MOD-SP4): 46.2 ML
BH CV ECHO MEAS - TR MAX PG: 25.5 MMHG
BH CV ECHO MEAS - TR MAX VEL: 252.5 CM/SEC
BH CV ECHO MEASUREMENTS AVERAGE E/E' RATIO: 13.54
LEFT ATRIUM VOLUME INDEX: 29.5 ML/M2
MAXIMAL PREDICTED HEART RATE: 146 BPM
STRESS TARGET HR: 124 BPM

## 2023-01-05 ENCOUNTER — TELEPHONE (OUTPATIENT)
Dept: CARDIOLOGY | Facility: CLINIC | Age: 73
End: 2023-01-05
Payer: MEDICARE

## 2023-01-05 NOTE — TELEPHONE ENCOUNTER
----- Message from MARTHA Cisneros sent at 1/5/2023  6:27 AM EST -----  Please advise patient.       Pt was sent a My Chart mess regarding his Echo results     Normal     Echo - ejection fraction is 55 to 60%.

## 2023-01-09 ENCOUNTER — TELEPHONE (OUTPATIENT)
Dept: CARDIOLOGY | Facility: CLINIC | Age: 73
End: 2023-01-09
Payer: MEDICARE

## 2023-01-09 RX ORDER — RANOLAZINE 500 MG/1
500 TABLET, EXTENDED RELEASE ORAL 2 TIMES DAILY
Qty: 60 TABLET | Refills: 11 | Status: SHIPPED | OUTPATIENT
Start: 2023-01-09 | End: 2023-02-06 | Stop reason: SDUPTHER

## 2023-01-12 ENCOUNTER — TELEPHONE (OUTPATIENT)
Dept: CARDIOLOGY | Facility: CLINIC | Age: 73
End: 2023-01-12
Payer: MEDICARE

## 2023-01-12 NOTE — TELEPHONE ENCOUNTER
Pt was sent a My Chart mess stating that Dr Ceballos would proceed with [facility] and if pt would like a sooner apt 01/31/2023 is the earliest which will be after lunch or if someone cancels we can get him in then . Or pt can keep the 02/03/2023 [facility] apt     HANANE Oh

## 2023-01-30 ENCOUNTER — TELEPHONE (OUTPATIENT)
Dept: CARDIOLOGY | Facility: CLINIC | Age: 73
End: 2023-01-30
Payer: MEDICARE

## 2023-01-30 NOTE — TELEPHONE ENCOUNTER
Pt LVM stating he has a cath tomorrow at 1400 and has several questions.   #[phone]      Per chart review, cath is scheduled for 1/31/23 at 2pm, needs to arrive by noon. No med holds.   Encounter from 1/12/23 states they will proceed w/ C w/o clearance from neuro.          Called pt, his wife answered:  · they asked about eating, I explained that he can have a light breakfast   · Asked about stenting, I explained if one if needed, he will be kept overnight    They denied having any additional questions.

## 2023-01-31 ENCOUNTER — OUTSIDE FACILITY SERVICE (OUTPATIENT)
Dept: CARDIOLOGY | Facility: CLINIC | Age: 73
End: 2023-01-31
Payer: MEDICARE

## 2023-01-31 PROCEDURE — 93458 L HRT ARTERY/VENTRICLE ANGIO: CPT | Performed by: INTERNAL MEDICINE

## 2023-02-01 ENCOUNTER — OUTSIDE FACILITY SERVICE (OUTPATIENT)
Dept: CARDIOLOGY | Facility: CLINIC | Age: 73
End: 2023-02-01
Payer: MEDICARE

## 2023-02-01 DIAGNOSIS — R93.89 ABNORMAL CT OF THE CHEST: ICD-10-CM

## 2023-02-01 DIAGNOSIS — I48.91 ATRIAL FIBRILLATION, UNSPECIFIED TYPE: ICD-10-CM

## 2023-02-01 DIAGNOSIS — R06.02 SHORTNESS OF BREATH: ICD-10-CM

## 2023-02-01 DIAGNOSIS — R07.89 OTHER CHEST PAIN: ICD-10-CM

## 2023-02-01 DIAGNOSIS — R94.39 ABNORMAL STRESS TEST: ICD-10-CM

## 2023-02-01 DIAGNOSIS — I25.10 CORONARY ARTERY DISEASE INVOLVING NATIVE CORONARY ARTERY OF NATIVE HEART WITHOUT ANGINA PECTORIS: ICD-10-CM

## 2023-02-06 RX ORDER — RANOLAZINE 500 MG/1
500 TABLET, EXTENDED RELEASE ORAL 2 TIMES DAILY
Qty: 60 TABLET | Refills: 11 | Status: SHIPPED | OUTPATIENT
Start: 2023-02-06 | End: 2023-02-23

## 2023-02-23 ENCOUNTER — OFFICE VISIT (OUTPATIENT)
Dept: CARDIOLOGY | Facility: CLINIC | Age: 73
End: 2023-02-23
Payer: MEDICARE

## 2023-02-23 VITALS
SYSTOLIC BLOOD PRESSURE: 109 MMHG | HEART RATE: 78 BPM | DIASTOLIC BLOOD PRESSURE: 62 MMHG | OXYGEN SATURATION: 96 % | WEIGHT: 163 LBS | BODY MASS INDEX: 24.71 KG/M2 | HEIGHT: 68 IN

## 2023-02-23 DIAGNOSIS — R06.02 SHORTNESS OF BREATH: ICD-10-CM

## 2023-02-23 DIAGNOSIS — R07.89 OTHER CHEST PAIN: ICD-10-CM

## 2023-02-23 DIAGNOSIS — I48.91 ATRIAL FIBRILLATION, UNSPECIFIED TYPE: ICD-10-CM

## 2023-02-23 DIAGNOSIS — I25.10 CORONARY ARTERY DISEASE INVOLVING NATIVE CORONARY ARTERY OF NATIVE HEART WITHOUT ANGINA PECTORIS: Primary | ICD-10-CM

## 2023-02-23 PROCEDURE — 99214 OFFICE O/P EST MOD 30 MIN: CPT | Performed by: NURSE PRACTITIONER

## 2023-02-23 RX ORDER — ISOSORBIDE MONONITRATE 30 MG/1
15 TABLET, EXTENDED RELEASE ORAL DAILY
Qty: 90 TABLET | Refills: 3 | Status: SHIPPED | OUTPATIENT
Start: 2023-02-23

## 2023-02-23 RX ORDER — EZETIMIBE 10 MG/1
10 TABLET ORAL DAILY
Qty: 90 TABLET | Refills: 3 | Status: SHIPPED | OUTPATIENT
Start: 2023-02-23

## 2023-02-23 RX ORDER — EMPAGLIFLOZIN 25 MG/1
TABLET, FILM COATED ORAL
COMMUNITY
Start: 2023-02-20

## 2023-02-23 NOTE — PROGRESS NOTES
Subjective   Deejay Messina is a 72 y.o. male     Chief Complaint   Patient presents with   • Follow-up     CATH F/U        HPI    Problem list:    1.  CAD with history of stent x2 per patient report 1982, [facility] in [place], followed Dr. Moeller [phone]  1.1 [facility] with Stent 5/6/2005, patient unsure what artery, Dr. Koch, [facility]  1.2  CTA of the lungs 9/15/2022-severe atherosclerotic plaque formation throughout the coronary arteries, 3.5 similar noncalcified nodule left lower lobe  1.3 stress test 12/27/2022-large, dense but incompletely reversible defect involving the entirety of the inferior wall as well as the more basal portions of the inferolateral wall compatible with ischemia, post-rest EF 48%, no wall motion abnormalities.  1.4 left heart cath 1/31/2020 23-50% stenosis in the circumflex, another 50% and a stent that is in the third obtuse, LAD had diffuse irregularities and distal to the stent there is no discrete high-grade stenosis, right coronary artery had a proximal 50% stenosis in the proximal vessel, EF 50 to 55%, LVEDP 16  2.  A. fib diagnosed in 2016, history of loop recorder 3/10/17 - 11/28/17; 2 episodes less than 0.1%, no anticoagulation/antiplatelet due to brain bleed  3.  Type 2 diabetes mellitus diagnosed in 2020  4.  Shortness of breath  4.1 echo 11/20/2018-EF 55 to 60%, diastolic dysfunction 1, trivial AR, moderate MR, TR with RVSP of 26  4.2 Echo 12/27/2022-EF 61 to 65%, mild LVH, diastolic dysfunction 1, PA 35-45  5.  COPD/asbestos exposure  6.  GERD  7.  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  History of brain bleed 2019 [facility] in [place], [place], craniotomy    Patient is a 72-year-old male who presents today for follow-up status post left heart cath with his wife at his side.  He denies any chest pain, pressure, palpitations, fluttering, dizziness, presyncope, syncope, orthopnea, PND or edema.  Patient says he does have some shortness of  breath if he overdoes it.  Overall he feels like he is doing well.  He was very impressed with the whole cath team and Dr. Ceballos at the hospital.  Right radial wrist site unremarkable.    We went over his left heart cath and echo.  His last LDL was 99.  I did advise patient on need to be on medication for cholesterol.  He does not want to take any statins because it caused him muscle aches in the past.  He is willing to try Zetia.    Current Outpatient Medications on File Prior to Visit   Medication Sig Dispense Refill   • albuterol (PROVENTIL) (2.5 MG/3ML) 0.083% nebulizer solution Take 2.5 mg by nebulization Every 4 (Four) Hours As Needed for Wheezing.     • dilTIAZem (CARDIZEM) 30 MG tablet Take 15 mg by mouth 3 (Three) Times a Day.     • DULoxetine (CYMBALTA) 30 MG capsule Take 30 mg by mouth Daily.     • insulin glargine (LANTUS, SEMGLEE) 100 UNIT/ML injection Inject 20 Units under the skin into the appropriate area as directed Daily. Sliding Scale - if its over 200 ( Blood Sugar ) pt will take 20 units     • ipratropium (ATROVENT HFA) 17 MCG/ACT inhaler Inhale 2 puffs As Needed for Wheezing.     • Jardiance 25 MG tablet tablet TAKE 1 TABLET EVERY DAY BY ORAL ROUTE FOR 30 DAYS.     • multivitamin with minerals tablet tablet Take 1 tablet by mouth Daily.     • nitroglycerin (NITROSTAT) 0.4 MG SL tablet 1 under the tongue as needed for angina, may repeat q5mins for up three doses 50 tablet 5   • omeprazole (priLOSEC) 20 MG capsule Take 20 mg by mouth As Needed.     • QUEtiapine XR (SEROquel XR) 150 MG 24 hr tablet Take 150 mg by mouth Every Night.     • [DISCONTINUED] isosorbide mononitrate (IMDUR) 30 MG 24 hr tablet Take 0.5 tablets by mouth Daily. 30 tablet 11   • [DISCONTINUED] glipizide (GLUCOTROL) 10 MG tablet Take 10 mg by mouth Daily.     • [DISCONTINUED] ranolazine (Ranexa) 500 MG 12 hr tablet Take 1 tablet by mouth 2 (Two) Times a Day. 60 tablet 11     No current facility-administered medications on  file prior to visit.       ALLERGIES    Codeine, Cotrim [sulfamethoxazole-trimethoprim], Statins, Aspirin, and Eliquis [apixaban]    Past Medical History:   Diagnosis Date   • Atrial fibrillation (HCC)    • Clotting disorder (HCC)    • COPD (chronic obstructive pulmonary disease) (HCC)    • Diabetes mellitus (HCC)    • Stroke (HCC)        Social History     Socioeconomic History   • Marital status:    Tobacco Use   • Smoking status: Former     Packs/day: 1.00     Years: 40.00     Pack years: 40.00     Types: Cigarettes   • Smokeless tobacco: Never   Vaping Use   • Vaping Use: Never used   Substance and Sexual Activity   • Alcohol use: Yes     Alcohol/week: 2.0 standard drinks     Types: 2 Cans of beer per week     Comment: per day   • Drug use: Never   • Sexual activity: Defer       Family History   Problem Relation Age of Onset   • Heart disease Mother    • Hypertension Mother    • Diabetes Mother    • Heart disease Father    • Hypertension Father    • COPD Father        Review of Systems   Constitutional: Negative for appetite change, chills, fatigue and fever.   HENT: Positive for hearing loss. Negative for dental problem, drooling, ear discharge, ear pain, sinus pressure, sinus pain, sneezing, sore throat and tinnitus.    Eyes: Positive for visual disturbance (wears glasses ). Negative for pain, redness and itching.   Respiratory: Positive for shortness of breath (does with activity; if over does it). Negative for cough, choking and wheezing.    Cardiovascular: Negative for chest pain, palpitations and leg swelling.   Gastrointestinal: Negative for blood in stool, constipation, diarrhea, nausea and rectal pain.   Endocrine: Negative.    Genitourinary: Negative for difficulty urinating.   Musculoskeletal: Positive for arthralgias, back pain and neck pain.   Skin: Negative for rash and wound.   Allergic/Immunologic: Negative for environmental allergies.   Neurological: Negative for dizziness, weakness and  "numbness.   Psychiatric/Behavioral: Positive for sleep disturbance.       Objective   /62   Pulse 78   Ht 172.7 cm (67.99\")   Wt 73.9 kg (163 lb)   SpO2 96%   BMI 24.79 kg/m²   Vitals:    02/23/23 0912   BP: 109/62   Pulse: 78   SpO2: 96%   Weight: 73.9 kg (163 lb)   Height: 172.7 cm (67.99\")      Lab Results (most recent)     None        Physical Exam  Vitals reviewed.   Constitutional:       General: He is awake.      Appearance: Normal appearance. He is well-developed, well-groomed and normal weight.   HENT:      Head: Normocephalic.      Right Ear: Decreased hearing noted.      Left Ear: Decreased hearing noted.      Ears:      Comments: Bilateral hearing aids  Eyes:      General: Lids are normal.      Comments: Wears glasses    Neck:      Vascular: No carotid bruit, hepatojugular reflux or JVD.   Cardiovascular:      Rate and Rhythm: Normal rate and regular rhythm.      Pulses:           Radial pulses are 2+ on the right side and 2+ on the left side.        Dorsalis pedis pulses are 2+ on the right side and 2+ on the left side.        Posterior tibial pulses are 2+ on the right side and 2+ on the left side.      Heart sounds: Normal heart sounds.   Pulmonary:      Effort: Pulmonary effort is normal.      Breath sounds: Normal breath sounds and air entry.   Abdominal:      General: Bowel sounds are normal.      Palpations: Abdomen is soft.   Musculoskeletal:      Right lower leg: No edema.      Left lower leg: No edema.   Skin:     General: Skin is warm and dry.   Neurological:      Mental Status: He is alert and oriented to person, place, and time.   Psychiatric:         Attention and Perception: Attention and perception normal.         Mood and Affect: Mood and affect normal.         Speech: Speech normal.         Behavior: Behavior normal. Behavior is cooperative.         Thought Content: Thought content normal.         Cognition and Memory: Cognition and memory normal.         Judgment: Judgment " normal.         Procedure   Procedures         Assessment & Plan      Diagnosis Plan   1. Coronary artery disease involving native coronary artery of native heart without angina pectoris  ezetimibe (ZETIA) 10 MG tablet    isosorbide mononitrate (IMDUR) 30 MG 24 hr tablet    Comprehensive Metabolic Panel    Lipid Panel      2. Atrial fibrillation, unspecified type (HCC)        3. Shortness of breath        4. Other chest pain  isosorbide mononitrate (IMDUR) 30 MG 24 hr tablet          Return in about 12 weeks (around 5/18/2023).    CAD-patient is not on antiplatelet therapy due to previous brain bleed.  He will try Zetia.  He will get a CMP and lipid in 6 weeks.  A-fib-patient is on diltiazem.  Shortness of breath-stable.  Chest pain-patient denies any chest pain and is doing well with the Imdur.  He will continue his medication regimen with above-noted change.  He will follow-up in 12 weeks or sooner if any changes.  This is to see if he is not tolerating the Zetia if he would like to try PK 9 inhibitors at that time.  If he does tolerate the Zetia he will get a CMP and lipid in 6 weeks.       Deejay Messina  reports that he has quit smoking. His smoking use included cigarettes. He has a 40.00 pack-year smoking history. He has never used smokeless tobacco..          Advance Care Planning   ACP discussion was declined by the patient. Patient does not have an advance directive, declines further assistance.       Electronically signed by:  MARTHA Cisneros

## 2023-08-30 ENCOUNTER — OFFICE VISIT (OUTPATIENT)
Dept: CARDIOLOGY | Facility: CLINIC | Age: 73
End: 2023-08-30
Payer: MEDICARE

## 2023-08-30 VITALS
HEIGHT: 68 IN | OXYGEN SATURATION: 96 % | WEIGHT: 162 LBS | HEART RATE: 82 BPM | BODY MASS INDEX: 24.55 KG/M2 | DIASTOLIC BLOOD PRESSURE: 70 MMHG | SYSTOLIC BLOOD PRESSURE: 133 MMHG

## 2023-08-30 DIAGNOSIS — I25.10 CORONARY ARTERY DISEASE INVOLVING NATIVE CORONARY ARTERY OF NATIVE HEART WITHOUT ANGINA PECTORIS: ICD-10-CM

## 2023-08-30 DIAGNOSIS — I51.89 DIASTOLIC DYSFUNCTION: ICD-10-CM

## 2023-08-30 DIAGNOSIS — I48.91 ATRIAL FIBRILLATION, UNSPECIFIED TYPE: Primary | ICD-10-CM

## 2023-08-30 DIAGNOSIS — I61.9 BRAIN BLEED: ICD-10-CM

## 2023-08-30 RX ORDER — GLIPIZIDE 10 MG/1
10 TABLET ORAL DAILY
COMMUNITY

## 2023-08-30 RX ORDER — INSULIN LISPRO 100 [IU]/ML
INJECTION, SOLUTION INTRAVENOUS; SUBCUTANEOUS AS NEEDED
COMMUNITY

## 2023-08-30 NOTE — PROGRESS NOTES
Subjective     Deejay Messina is a 73 y.o. male who presents today for Follow-up ([facility] f/u ) and Coronary Artery Disease.    CHIEF COMPLIANT  Chief Complaint   Patient presents with    Follow-up     [facility] f/u     Coronary Artery Disease     Problem List:  CAD: previous stents x 2 in 1982, [facility], stent 5/6/2005   [facility] 1/31/23: Left main normal, LCx 50% between second and third OM with stent extended to third OM diffuse moderate disease no discrete high-grade stenosis, LAD: Stent widely patent diffuse irregularities in the proximal and distal to stented section, no high-grade stenosis.  RCA 50% proximal, EF 50 to 55%, LVEDP 16.  Atrial fibrillation: Diagnosed in 2016.  Not on anticoagulation/antiplatelet therapy due to brain bleed in 2019 from Eliquis.  Craniotomy at [facility] in [place].  Diabetes mellitus type 2  Echocardiogram 12/27/2022: EF 61 to 65%, mild LVH, diastolic dysfunction, PA pressure 35 to 45 mmHg  COPD/asbestos exposure  History of CVA 2018, blood clot left eye  Active Problems:  Problem List Items Addressed This Visit          Cardiac and Vasculature    Coronary artery disease involving native coronary artery of native heart without angina pectoris    Atrial fibrillation - Primary       Neuro    Brain bleed     Other Visit Diagnoses       Diastolic dysfunction                HPI  The patient is a 73 year old male with a history of atrial fibrillation not on anticoagulation due to brain bleed from Eliquis in 2019, CAD s/p remote stenting, grade 1 diastolic dysfunction that presented for routine follow up.  Overall he is doing well.  He denies chest pain, significant SOA, syncope, near syncope, orthopnea, PND.  He does have some occasional palpitations which he describes as a fluttering, but is asymptomatic from this.  He will have SOA with significant exertion.  HR and BP stable.          Coronary Artery Disease  Symptoms include palpitations (occas. flutter  in the morning) and shortness of breath. Pertinent negatives include no chest pain, chest tightness, dizziness or leg swelling.     PRIOR MEDS  Current Outpatient Medications on File Prior to Visit   Medication Sig Dispense Refill    albuterol (PROVENTIL) (2.5 MG/3ML) 0.083% nebulizer solution Take 2.5 mg by nebulization Every 4 (Four) Hours As Needed for Wheezing.      dilTIAZem (CARDIZEM) 30 MG tablet Take 0.5 tablets by mouth 3 (Three) Times a Day.      DULoxetine (CYMBALTA) 30 MG capsule Take 1 capsule by mouth Daily.      glipizide (GLUCOTROL) 10 MG tablet Take 1 tablet by mouth Daily.      insulin glargine (LANTUS, SEMGLEE) 100 UNIT/ML injection Inject 20 Units under the skin into the appropriate area as directed Daily. Sliding Scale - if its over 200 ( Blood Sugar ) pt will take 20 units      Insulin Lispro (humaLOG) 100 UNIT/ML injection Inject  under the skin into the appropriate area as directed As Needed for High Blood Sugar.      ipratropium (ATROVENT HFA) 17 MCG/ACT inhaler Inhale 2 puffs As Needed for Wheezing.      isosorbide mononitrate (IMDUR) 30 MG 24 hr tablet Take 0.5 tablets by mouth Daily. 90 tablet 3    nitroglycerin (NITROSTAT) 0.4 MG SL tablet 1 under the tongue as needed for angina, may repeat q5mins for up three doses 50 tablet 5    QUEtiapine XR (SEROquel XR) 150 MG 24 hr tablet Take 1 tablet by mouth Every Night.       No current facility-administered medications on file prior to visit.       ALLERGIES  Codeine, Cotrim [sulfamethoxazole-trimethoprim], Statins, Aspirin, and Eliquis [apixaban]    HISTORY  Past Medical History:   Diagnosis Date    Asthma 1998    Atrial fibrillation     Clotting disorder     COPD (chronic obstructive pulmonary disease)     Diabetes mellitus     Stroke        Social History     Socioeconomic History    Marital status:    Tobacco Use    Smoking status: Former     Packs/day: 1.00     Years: 54.00     Pack years: 54.00     Types: Cigarettes, Pipe, Cigars,  Electronic Cigarette     Start date: 1960     Quit date: 2014     Years since quittin.6    Smokeless tobacco: Never    Tobacco comments:     Pipe use for about 7 years exclusively   Vaping Use    Vaping Use: Never used   Substance and Sexual Activity    Alcohol use: Yes     Alcohol/week: 14.0 standard drinks     Types: 14 Cans of beer per week     Comment: per day    Drug use: Never    Sexual activity: Not Currently     Partners: Female     Birth control/protection: Condom, I.U.D., Vasectomy       Family History   Problem Relation Age of Onset    Heart disease Mother     Hypertension Mother     Diabetes Mother     Arrhythmia Mother     Heart disease Father     Hypertension Father     COPD Father        Review of Systems   Constitutional: Negative.  Negative for chills, diaphoresis, fatigue and fever.   HENT: Negative.     Eyes:  Positive for visual disturbance (no vision in left eye).   Respiratory:  Positive for cough, shortness of breath and wheezing. Negative for apnea and chest tightness.    Cardiovascular:  Positive for palpitations (occas. flutter in the morning). Negative for chest pain and leg swelling.   Gastrointestinal: Negative.  Negative for blood in stool.   Endocrine: Negative.  Negative for cold intolerance and heat intolerance.   Genitourinary: Negative.  Negative for hematuria.   Musculoskeletal:  Positive for back pain, myalgias (left leg), neck pain and neck stiffness. Negative for arthralgias.   Skin: Negative.  Negative for color change, rash and wound.   Allergic/Immunologic: Negative.  Negative for environmental allergies.   Neurological: Negative.  Negative for dizziness, syncope, weakness, light-headedness, numbness and headaches.   Hematological: Negative.  Does not bruise/bleed easily.   Psychiatric/Behavioral:  Positive for sleep disturbance (insomnia, restlessness).      Objective     VITALS: /70 (BP Location: Left arm, Patient Position: Sitting)   Pulse 82   Ht  "172.7 cm (67.99\")   Wt 73.5 kg (162 lb)   SpO2 96%   BMI 24.64 kg/mý     LABS:   Lab Results (most recent)       None            IMAGING:   No Images in the past 120 days found..    EXAM:  Physical Exam  Constitutional:       Appearance: Normal appearance.   Eyes:      Pupils: Pupils are equal, round, and reactive to light.   Cardiovascular:      Rate and Rhythm: Normal rate and regular rhythm.      Pulses:           Carotid pulses are 2+ on the right side and 2+ on the left side.       Radial pulses are 2+ on the right side and 2+ on the left side.        Dorsalis pedis pulses are 2+ on the right side and 2+ on the left side.        Posterior tibial pulses are 2+ on the right side and 2+ on the left side.      Heart sounds: Normal heart sounds.   Pulmonary:      Effort: Pulmonary effort is normal.      Breath sounds: Normal breath sounds.   Abdominal:      General: Bowel sounds are normal.      Palpations: Abdomen is soft.   Musculoskeletal:      Right lower leg: No edema.      Left lower leg: No edema.   Skin:     General: Skin is warm and dry.      Capillary Refill: Capillary refill takes less than 2 seconds.   Neurological:      General: No focal deficit present.      Mental Status: He is alert and oriented to person, place, and time.   Psychiatric:         Mood and Affect: Mood normal.         Thought Content: Thought content normal.         Procedure   Procedures       Assessment & Plan    Diagnosis Plan   1. Atrial fibrillation, unspecified type        2. Coronary artery disease involving native coronary artery of native heart without angina pectoris        3. Brain bleed        4. Diastolic dysfunction          Plan:  Atrial fibrillation: No on AC due to bleed complication (brain bleed) from Eliquis requiring craniotomy.    CAD: No antiplatelet due to h/o bleeding complication.  Continue imdur.  Continue diltiazem.  He is intolerant of both statins and zetia.  There have been discussions previously " regarding PCSK9 inhibitors and the patient did not want to pursue this.    Diastolic dysfunction: No current signs of overload.  Did not tolerate jardiance.  The patient was advised to notify our office if sx of SOA or LE edema develop.      Will plan to see the patient back in 6 months, sooner if needed depending on symptoms.    No follow-ups on file.    Diagnoses and all orders for this visit:    1. Atrial fibrillation, unspecified type (Primary)    2. Coronary artery disease involving native coronary artery of native heart without angina pectoris    3. Brain bleed    4. Diastolic dysfunction          Patient brought in medicine list to appointment, it's been reviewed with patient and med list was updated in the chart.        Advance Care Planning   ACP discussion was held with the patient during this visit. Patient has an advance directive in EMR which is still valid.           MEDS ORDERED DURING VISIT:  No orders of the defined types were placed in this encounter.      DISCONTINUED MEDS DURING VISIT:   Medications Discontinued During This Encounter   Medication Reason    ezetimibe (ZETIA) 10 MG tablet Patient Reported Not Taking    Jardiance 25 MG tablet tablet Patient Reported Not Taking    multivitamin with minerals tablet tablet Patient Reported Not Taking    omeprazole (priLOSEC) 20 MG capsule Patient Reported Not Taking          This document has been electronically signed by MARTHA Mandujano  September 3, 2023 09:29 EDT    Dictated Utilizing Dragon Dictation: Part of this note may be an electronic transcription/translation of spoken language to printed text using the Dragon Dictation System

## 2023-09-03 RX ORDER — ISOSORBIDE MONONITRATE 30 MG/1
15 TABLET, EXTENDED RELEASE ORAL DAILY
Qty: 90 TABLET | Refills: 3 | Status: SHIPPED | OUTPATIENT
Start: 2023-09-03

## 2024-03-04 ENCOUNTER — OFFICE VISIT (OUTPATIENT)
Dept: CARDIOLOGY | Facility: CLINIC | Age: 74
End: 2024-03-04
Payer: MEDICARE

## 2024-03-04 VITALS
HEIGHT: 67 IN | OXYGEN SATURATION: 96 % | WEIGHT: 145 LBS | HEART RATE: 75 BPM | BODY MASS INDEX: 22.76 KG/M2 | SYSTOLIC BLOOD PRESSURE: 137 MMHG | DIASTOLIC BLOOD PRESSURE: 67 MMHG

## 2024-03-04 DIAGNOSIS — I10 ESSENTIAL HYPERTENSION: ICD-10-CM

## 2024-03-04 DIAGNOSIS — I48.91 ATRIAL FIBRILLATION, UNSPECIFIED TYPE: Primary | ICD-10-CM

## 2024-03-04 DIAGNOSIS — I25.10 CORONARY ARTERY DISEASE INVOLVING NATIVE CORONARY ARTERY OF NATIVE HEART WITHOUT ANGINA PECTORIS: ICD-10-CM

## 2024-03-04 PROCEDURE — 1160F RVW MEDS BY RX/DR IN RCRD: CPT | Performed by: CLINICAL NURSE SPECIALIST

## 2024-03-04 PROCEDURE — 99214 OFFICE O/P EST MOD 30 MIN: CPT | Performed by: CLINICAL NURSE SPECIALIST

## 2024-03-04 PROCEDURE — 93000 ELECTROCARDIOGRAM COMPLETE: CPT | Performed by: CLINICAL NURSE SPECIALIST

## 2024-03-04 PROCEDURE — 1159F MED LIST DOCD IN RCRD: CPT | Performed by: CLINICAL NURSE SPECIALIST

## 2024-03-04 RX ORDER — TEMAZEPAM 15 MG/1
CAPSULE ORAL
COMMUNITY
Start: 2024-02-09

## 2024-03-04 NOTE — PROGRESS NOTES
Subjective     Deejay Messina is a 73 y.o. male who presents today for Follow-up.    CHIEF COMPLIANT  Chief Complaint   Patient presents with    Follow-up       Active Problems:    CAD: previous stents x 2 in 1982, [facility], stent 5/6/2005   [facility] 1/31/23: Left main normal, LCx 50% between second and third OM with stent extended to third OM diffuse moderate disease no discrete high-grade stenosis, LAD: Stent widely patent diffuse irregularities in the proximal and distal to stented section, no high-grade stenosis.  RCA 50% proximal, EF 50 to 55%, LVEDP 16.  Atrial fibrillation: Diagnosed in 2016.  Not on anticoagulation/antiplatelet therapy due to brain bleed in 2019 from [facility].  Craniotomy at [facility] in [place].  Diabetes mellitus type 2  Echocardiogram 12/27/2022: EF 61 to 65%, mild LVH, diastolic dysfunction, PA pressure 35 to 45 mmHg  COPD/asbestos exposure  History of CVA 2018, blood clot left eye    HPI  The patient is a 73-year-old male that returns for routine follow-up.  Overall the patient states he is doing well.  He denies chest pain, shortness of air, syncope or near syncope.  His EKG shows normal sinus rhythm with no acute ST or T wave abnormalities.  He denies palpitations.  His heart rate and blood pressure are under good control.  He states he has had no episodes of atrial fibrillation that he is aware of.    PRIOR MEDS  Current Outpatient Medications on File Prior to Visit   Medication Sig Dispense Refill    dilTIAZem (CARDIZEM) 30 MG tablet Take 0.5 tablets by mouth 3 (Three) Times a Day.      DULoxetine (CYMBALTA) 60 MG capsule Take 1 capsule by mouth Daily.      glipizide (GLUCOTROL) 10 MG tablet Take 1 tablet by mouth Daily.      insulin glargine (LANTUS, SEMGLEE) 100 UNIT/ML injection Inject 20 Units under the skin into the appropriate area as directed Daily. Sliding Scale - if its over 200 ( Blood Sugar ) pt will take 20 units      isosorbide mononitrate  (IMDUR) 30 MG 24 hr tablet Take 0.5 tablets by mouth Daily. 90 tablet 3    nitroglycerin (NITROSTAT) 0.4 MG SL tablet 1 under the tongue as needed for angina, may repeat q5mins for up three doses 50 tablet 5    QUEtiapine XR (SEROquel XR) 200 MG 24 hr tablet Take 100 mg by mouth Every Night.      temazepam (RESTORIL) 15 MG capsule TAKE 1 CAPSULE AT BEDTIME AS NEEDED FOR SLEEP      [DISCONTINUED] Insulin Lispro (humaLOG) 100 UNIT/ML injection Inject  under the skin into the appropriate area as directed As Needed for High Blood Sugar.      [DISCONTINUED] albuterol (PROVENTIL) (2.5 MG/3ML) 0.083% nebulizer solution Take 2.5 mg by nebulization Every 4 (Four) Hours As Needed for Wheezing.      [DISCONTINUED] ipratropium (ATROVENT HFA) 17 MCG/ACT inhaler Inhale 2 puffs As Needed for Wheezing.       No current facility-administered medications on file prior to visit.       ALLERGIES  Codeine, Cotrim [sulfamethoxazole-trimethoprim], Statins, Aspirin, and Eliquis [apixaban]    HISTORY  Past Medical History:   Diagnosis Date    Asthma 1998    Atrial fibrillation     Clotting disorder     COPD (chronic obstructive pulmonary disease)     Diabetes mellitus     Stroke        Social History     Socioeconomic History    Marital status:    Tobacco Use    Smoking status: Some Days     Current packs/day: 0.00     Average packs/day: 1 pack/day for 54.0 years (54.0 ttl pk-yrs)     Types: Cigarettes, Pipe, Cigars, Electronic Cigarette     Start date: 1/1/1960     Last attempt to quit: 1/1/2014     Years since quitting: 10.1    Smokeless tobacco: Never    Tobacco comments:     Pipe use for about 7 years exclusively   Vaping Use    Vaping status: Never Used   Substance and Sexual Activity    Alcohol use: Yes     Alcohol/week: 14.0 standard drinks of alcohol     Types: 14 Cans of beer per week     Comment: per day    Drug use: Never    Sexual activity: Not Currently     Partners: Female     Birth control/protection: Condom, I.U.D.,  "Vasectomy       Family History   Problem Relation Age of Onset    Heart disease Mother     Hypertension Mother     Diabetes Mother     Arrhythmia Mother     Heart disease Father     Hypertension Father     COPD Father        Review of Systems   Constitutional:  Negative for chills, diaphoresis, fatigue and fever.   HENT:  Positive for hearing loss (hearing aids).    Eyes: Negative.  Negative for visual disturbance (glasses).   Respiratory: Negative.  Negative for apnea, cough, chest tightness, shortness of breath and wheezing.    Cardiovascular: Negative.  Negative for chest pain, palpitations and leg swelling.   Gastrointestinal: Negative.  Negative for blood in stool.   Endocrine: Negative.  Negative for cold intolerance and heat intolerance.   Genitourinary: Negative.  Negative for hematuria.   Musculoskeletal:  Positive for arthralgias, back pain, neck pain (occas.) and neck stiffness. Negative for myalgias.   Skin: Negative.  Negative for color change, rash and wound.   Allergic/Immunologic: Negative.  Negative for environmental allergies and food allergies.   Neurological:  Positive for headaches (occas.). Negative for dizziness, syncope, weakness, light-headedness and numbness.   Hematological: Negative.  Does not bruise/bleed easily.   Psychiatric/Behavioral: Negative.  Negative for sleep disturbance.        Objective     VITALS: /67 (BP Location: Right arm, Patient Position: Sitting)   Pulse 75   Ht 170.2 cm (67\")   Wt 65.8 kg (145 lb)   SpO2 96%   BMI 22.71 kg/m²     LABS:   Lab Results (most recent)       None            IMAGING:   No Images in the past 120 days found..    EXAM:    Constitutional:       Appearance: Normal appearance.   Eyes:      Pupils: Pupils are equal, round, and reactive to light.   Cardiovascular:      Rate and Rhythm: Normal rate and regular rhythm.      Pulses:           Carotid pulses are 2+ on the right side and 2+ on the left side.       Radial pulses are 2+ on the " right side and 2+ on the left side.        Dorsalis pedis pulses are 2+ on the right side and 2+ on the left side.        Posterior tibial pulses are 2+ on the right side and 2+ on the left side.      Heart sounds: Normal heart sounds.   Pulmonary:      Effort: Pulmonary effort is normal.      Breath sounds: Normal breath sounds.   Abdominal:      General: Bowel sounds are normal.      Palpations: Abdomen is soft.   Musculoskeletal:      Right lower leg: No edema.      Left lower leg: No edema.   Skin:     General: Skin is warm and dry.      Capillary Refill: Capillary refill takes less than 2 seconds.   Neurological:      General: No focal deficit present.      Mental Status: He is alert and oriented to person, place, and time.   Psychiatric:         Mood and Affect: Mood normal.         Thought Content: Thought content normal.   Procedure     ECG 12 Lead    Date/Time: 3/4/2024 1:21 PM  Performed by: Magdalena Camacho APRN    Authorized by: Magdalena Camacho APRN  Comparison: compared with previous ECG from 4/22/2023  Rhythm: sinus rhythm  Rate: normal  BPM: 68  Conduction: conduction normal  ST Segments: ST segments normal  T Waves: T waves normal  QRS axis: normal             Assessment & Plan    Diagnosis Plan   1. Atrial fibrillation, unspecified type        2. Coronary artery disease involving native coronary artery of native heart without angina pectoris        3. Essential hypertension          Plan:  1.  Paroxysmal atrial fibrillation: Patient denies palpitations.  He is unable to be on long-term anticoagulation due to history of a significant brain bleed while on anticoagulation.  He was advised to notify our office if symptoms develop.  2.  Coronary artery disease: Denies current chest pain.  Will continue current medications.  He was advised to notify our office if symptoms develop.  3.  Essential hypertension: Blood pressure under good control with current medication regimen.  The patient was  instructed to monitor BP at home and to notify our office if systolic BP is consistently greater than 130-135 systolic.  Goal BP is 130-135/70-80.  Return in about 1 year (around 3/4/2025).    Deejay Messina  reports that he has been smoking cigarettes, pipe, cigars, and electronic cigarette. He started smoking about 64 years ago. He has a 54 pack-year smoking history. He has never used smokeless tobacco..    Patient brought in medicine list to appointment, it's been reviewed with patient and med list was updated in the chart.     Advance Care Planning   ACP discussion was held with the patient during this visit. Patient has an advance directive (not in EMR), copy requested.       BMI is within normal parameters. No other follow-up for BMI required.           MEDS ORDERED DURING VISIT:  No orders of the defined types were placed in this encounter.      DISCONTINUED MEDS DURING VISIT:   Medications Discontinued During This Encounter   Medication Reason    albuterol (PROVENTIL) (2.5 MG/3ML) 0.083% nebulizer solution Patient Reported Not Taking    Insulin Lispro (humaLOG) 100 UNIT/ML injection Patient Reported Not Taking    ipratropium (ATROVENT HFA) 17 MCG/ACT inhaler Patient Reported Not Taking          This document has been electronically signed by MARTHA Mandujano  March 4, 2024 14:17 EST    Dictated Utilizing Dragon Dictation: Part of this note may be an electronic transcription/translation of spoken language to printed text using the Dragon Dictation System